# Patient Record
Sex: MALE | Race: WHITE | Employment: OTHER | URBAN - METROPOLITAN AREA
[De-identification: names, ages, dates, MRNs, and addresses within clinical notes are randomized per-mention and may not be internally consistent; named-entity substitution may affect disease eponyms.]

---

## 2017-11-16 ENCOUNTER — OFFICE VISIT (OUTPATIENT)
Dept: DERMATOLOGY | Age: 77
End: 2017-11-16

## 2017-11-16 DIAGNOSIS — L57.0 AK (ACTINIC KERATOSIS): Primary | ICD-10-CM

## 2017-11-16 DIAGNOSIS — D48.5 NEOPLASM OF UNCERTAIN BEHAVIOR OF SKIN: ICD-10-CM

## 2017-11-16 PROCEDURE — 17003 DESTRUCT PREMALG LES 2-14: CPT | Performed by: DERMATOLOGY

## 2017-11-16 PROCEDURE — G8421 BMI NOT CALCULATED: HCPCS | Performed by: DERMATOLOGY

## 2017-11-16 PROCEDURE — 4040F PNEUMOC VAC/ADMIN/RCVD: CPT | Performed by: DERMATOLOGY

## 2017-11-16 PROCEDURE — 17000 DESTRUCT PREMALG LESION: CPT | Performed by: DERMATOLOGY

## 2017-11-16 PROCEDURE — G8484 FLU IMMUNIZE NO ADMIN: HCPCS | Performed by: DERMATOLOGY

## 2017-11-16 PROCEDURE — 1036F TOBACCO NON-USER: CPT | Performed by: DERMATOLOGY

## 2017-11-16 PROCEDURE — 11100 PR BIOPSY OF SKIN LESION: CPT | Performed by: DERMATOLOGY

## 2017-11-16 PROCEDURE — 11101 PR BIOPSY, EACH ADDED LESION: CPT | Performed by: DERMATOLOGY

## 2017-11-16 PROCEDURE — 1123F ACP DISCUSS/DSCN MKR DOCD: CPT | Performed by: DERMATOLOGY

## 2017-11-16 PROCEDURE — 99213 OFFICE O/P EST LOW 20 MIN: CPT | Performed by: DERMATOLOGY

## 2017-11-16 PROCEDURE — G8427 DOCREV CUR MEDS BY ELIG CLIN: HCPCS | Performed by: DERMATOLOGY

## 2017-11-16 NOTE — PROGRESS NOTES
[facility] Dermatology  Mauri Arce MD  [id number]  1940    76 y.o. male     Date of Visit: 11/16/2017    Chief Complaint: f/u for AKs    History of Present Illness:    \"Jl\"    1. F/u for multiple AKs - complains of scaly lesions on the face and upper extremities. 2.  He also complains of a new noted lesion on the left lower lip. Unknown duration of a lesion on the nasal tip. He has a persistent brown patch on the left lower cheek. Review of Systems:  Skin: No new or changing moles. Past Medical History, Family History, Surgical History, Medications and Allergies reviewed. 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 Physical Examination       The following were examined and determined to be normal: Psych/Neuro, Scalp/hair, Conjunctivae/eyelids, Gums/teeth/lips, Neck, Breast/axilla/chest, Abdomen, Back, RLE, LLE and Nails/digits. The following were examined and determined to be abnormal: Head/face, RUE and LUE. Well appearing. 1.  Hands and forearms - numerous ill defined irreg shaped keratotic pink macules. Right temple - 1, right cheek - 2, right helix - 3, proximal nasal dorsum - 2, left lateral cheek - 2: ill defined irreg shaped keratotic pink macules. 2.    A.  Left nasal tip - 4 mm telangiectatic pearly papule. B.   Left lower cutaneous lip - 4 mm

## 2017-11-16 NOTE — PATIENT INSTRUCTIONS
Biopsy Wound Care Instructions    · Keep the bandage in place for 24 hours. · Cleanse the wound with mild soapy water daily   Gently dry the area.  Apply Vaseline or petroleum jelly to the wound using a cotton tipped applicator.  Cover with a clean bandage.  Repeat this process until the biopsy site is healed.  If you had stitches placed, continue treating the site until the stitches are removed. Remember to make an appointment to return to have your stitches removed by our staff.  You may shower and bathe as usual.       ** Biopsy results generally take around 7 business days to come back. If you have not heard from us by then, please call the office at [phone] between 8AM and 4PM Monday through Friday. Cryosurgery (Freezing) Wound Care Instructions    AFTER THE PROCEDURE:    You will notice swelling and redness around the site. This is normal.    You may experience a sharp or sore feeling for the next several days. For this discomfort, you may take acetaminophen (Tylenol©).  A blister may develop at the treated area, sometimes as soon as by the end of the day. After several days, the blister will subside and a scab will form.  If the area is bumped or traumatized during the first few days following freezing, you may develop bleeding into the blister, forming a blood blister. This is nothing to be alarmed about.  If the blister is tense, uncomfortable, or much larger than the site that was frozen, you may pop the blister along its edge with a sterile needle (boiled, heated under a flame, or cleaned with alcohol) to allow the fluid to drain out. If the blister does not bother you, no treatment is needed.  Do NOT peel off the top of the blister roof. It will act as a dressing on top of your wound. WOUND CARE:    You may shower or bathe as usual, but avoid scrubbing the areas that have been frozen.      Cleanse the site twice a day with mild soapy water, and then apply a thin

## 2017-11-21 ENCOUNTER — TELEPHONE (OUTPATIENT)
Dept: DERMATOLOGY | Age: 77
End: 2017-11-21

## 2017-11-21 NOTE — TELEPHONE ENCOUNTER
[facility] called and running more stains on his left cheek. Will be delayed     Call back # if you need the other biopsy that they are running sooner, call at [phone].

## 2017-11-22 ENCOUNTER — TELEPHONE (OUTPATIENT)
Dept: DERMATOLOGY | Age: 77
End: 2017-11-22

## 2017-11-30 ENCOUNTER — TELEPHONE (OUTPATIENT)
Dept: DERMATOLOGY | Age: 77
End: 2017-11-30

## 2017-11-30 DIAGNOSIS — C44.01 BCC (BASAL CELL CARCINOMA), LIP: ICD-10-CM

## 2017-11-30 DIAGNOSIS — C44.311 BASAL CELL CARCINOMA OF NASAL TIP: ICD-10-CM

## 2017-11-30 DIAGNOSIS — D03.39 MELANOMA IN SITU OF CHEEK (HCC): Primary | ICD-10-CM

## 2017-11-30 NOTE — TELEPHONE ENCOUNTER
Patient calling back in regards to his biopsy results. Will ohnly be home for 20 mins or so. Then will home later today.     Call back# [phone]

## 2017-12-11 ENCOUNTER — PROCEDURE VISIT (OUTPATIENT)
Dept: SURGERY | Age: 77
End: 2017-12-11

## 2017-12-11 VITALS
SYSTOLIC BLOOD PRESSURE: 131 MMHG | TEMPERATURE: 97.6 F | WEIGHT: 193 LBS | OXYGEN SATURATION: 99 % | DIASTOLIC BLOOD PRESSURE: 73 MMHG | HEART RATE: 66 BPM

## 2017-12-11 DIAGNOSIS — D03.39 LENTIGO MALIGNA OF LEFT CHEEK (HCC): Primary | ICD-10-CM

## 2017-12-11 PROCEDURE — 11644 EXC F/E/E/N/L MAL+MRG 3.1-4: CPT | Performed by: DERMATOLOGY

## 2017-12-11 NOTE — PATIENT INSTRUCTIONS
stop (see above)   Pain that lasts longer than 48 hours   Your wound becomes more painful, red or hot   Bruising and swelling that does not improve within 48 hours or gets worse suddenly

## 2017-12-11 NOTE — PROGRESS NOTES
map was drawn and labelled and placed in the patient's chart as well as sent with the pathologic specimens. A pressure dressing was applied to the wound. The patient was given detailed oral and written instructions on home care and all questions were answered. The patient tolerated the procedure well without any complications. The patient left the office in stable condition. The patient is scheduled to return on Monday December 18  for Stage II or repair depending on pathology results.

## 2017-12-18 RX ORDER — CEPHALEXIN 500 MG/1
500 CAPSULE ORAL 3 TIMES DAILY
Qty: 15 CAPSULE | Refills: 0 | Status: SHIPPED | OUTPATIENT
Start: 2017-12-18 | End: 2018-09-20 | Stop reason: ALTCHOICE

## 2017-12-20 ENCOUNTER — PROCEDURE VISIT (OUTPATIENT)
Dept: SURGERY | Age: 77
End: 2017-12-20

## 2017-12-20 VITALS
WEIGHT: 193 LBS | DIASTOLIC BLOOD PRESSURE: 67 MMHG | TEMPERATURE: 97.6 F | SYSTOLIC BLOOD PRESSURE: 110 MMHG | OXYGEN SATURATION: 97 % | HEART RATE: 66 BPM

## 2017-12-20 DIAGNOSIS — Z86.006 HISTORY OF MELANOMA IN SITU: ICD-10-CM

## 2017-12-20 DIAGNOSIS — S01.80XA OPEN WOUND OF FACE, INITIAL ENCOUNTER: Primary | ICD-10-CM

## 2017-12-20 PROBLEM — D03.39: Status: ACTIVE | Noted: 2017-12-20

## 2017-12-20 PROCEDURE — 14301 TIS TRNFR ANY 30.1-60 SQ CM: CPT | Performed by: DERMATOLOGY

## 2017-12-20 NOTE — PROGRESS NOTES
PRE-PROCEDURE SCREENING    Pacemaker/ICD: No  Difficulty with numbing in the past: No  Local Anesthesia Reaction/passing out: No  Latex or adhesive allergy:  No  Bleeding/Clotting Disorders: No  Anticoagulant Therapy: Yes  Joint prosthesis: No  Artificial Heart Valve: No  Stroke or Seizures: No  Organ Transplant or Lymphoma: No  Immunosuppression: No  Respiratory Problems: No
procedure well without any complications. The patient left the office in good condition. The patient will return for suture removal/wound check in 7-21 days.

## 2018-01-11 ENCOUNTER — OFFICE VISIT (OUTPATIENT)
Dept: SURGERY | Age: 78
End: 2018-01-11

## 2018-01-11 DIAGNOSIS — Z86.006 HISTORY OF MELANOMA IN SITU: ICD-10-CM

## 2018-01-11 DIAGNOSIS — L90.5 SCAR CONDITION AND FIBROSIS OF SKIN: Primary | ICD-10-CM

## 2018-01-11 PROCEDURE — 99024 POSTOP FOLLOW-UP VISIT: CPT | Performed by: DERMATOLOGY

## 2018-01-11 NOTE — PROGRESS NOTES
S: The patient is here for wound check s/p delayed repair of a staged excision on the left lower cheekwith Rhombic Transposition Flap repair, 3 weeks ago. The patient states that he feels the area is healing well but was concerned with how the scar will look over time. O: The wound/scar shows no signs of infection/bleeding or other complications (no erythema, edema, pain, purulence or drainage). Has healed very well. A/P:  No issues. Slight firmness of scar which is within normal range at 3 weeks out. D/w pt at length massage and expectations over time. We also discussed two other lesions - bx proven BCCs on left lower cutaneous lip and left nasal tip. Pt would like to wait until April to have these removed due to a busy work schedule. I informed him that BCCS grow very slowly and waiting until April would likely not impact size significantly, but no guarantee in regards to rate of growth. Encouraged him to come in sooner if he notices lesions growing more rapidly or becoming symptomatic. Patient questions answered and expectations reviewed. The patient is scheduled for f/u skin examination with General Dermatology per their recommendations.

## 2018-04-02 ENCOUNTER — PROCEDURE VISIT (OUTPATIENT)
Dept: SURGERY | Age: 78
End: 2018-04-02

## 2018-04-02 VITALS
WEIGHT: 195 LBS | HEART RATE: 63 BPM | OXYGEN SATURATION: 96 % | SYSTOLIC BLOOD PRESSURE: 125 MMHG | TEMPERATURE: 97.8 F | DIASTOLIC BLOOD PRESSURE: 75 MMHG

## 2018-04-02 DIAGNOSIS — C44.311 BASAL CELL CARCINOMA OF NOSE: Primary | ICD-10-CM

## 2018-04-02 PROCEDURE — 14060 TIS TRNFR E/N/E/L 10 SQ CM/<: CPT | Performed by: DERMATOLOGY

## 2018-04-02 PROCEDURE — 17312 MOHS ADDL STAGE: CPT | Performed by: DERMATOLOGY

## 2018-04-02 PROCEDURE — 17311 MOHS 1 STAGE H/N/HF/G: CPT | Performed by: DERMATOLOGY

## 2018-04-03 ENCOUNTER — TELEPHONE (OUTPATIENT)
Dept: SURGERY | Age: 78
End: 2018-04-03

## 2018-04-06 ENCOUNTER — NURSE ONLY (OUTPATIENT)
Dept: SURGERY | Age: 78
End: 2018-04-06

## 2018-04-06 ENCOUNTER — TELEPHONE (OUTPATIENT)
Dept: SURGERY | Age: 78
End: 2018-04-06

## 2018-04-06 DIAGNOSIS — Z51.89 VISIT FOR WOUND CHECK: Primary | ICD-10-CM

## 2018-04-09 ENCOUNTER — PROCEDURE VISIT (OUTPATIENT)
Dept: SURGERY | Age: 78
End: 2018-04-09

## 2018-04-09 VITALS
DIASTOLIC BLOOD PRESSURE: 72 MMHG | SYSTOLIC BLOOD PRESSURE: 114 MMHG | OXYGEN SATURATION: 95 % | TEMPERATURE: 97.9 F | WEIGHT: 195 LBS | HEART RATE: 63 BPM

## 2018-04-09 DIAGNOSIS — C44.01 BCC (BASAL CELL CARCINOMA), LIP: Primary | ICD-10-CM

## 2018-04-09 DIAGNOSIS — Z48.02 VISIT FOR SUTURE REMOVAL: ICD-10-CM

## 2018-04-09 PROCEDURE — 17311 MOHS 1 STAGE H/N/HF/G: CPT | Performed by: DERMATOLOGY

## 2018-04-09 PROCEDURE — 12051 INTMD RPR FACE/MM 2.5 CM/<: CPT | Performed by: DERMATOLOGY

## 2018-04-10 ENCOUNTER — TELEPHONE (OUTPATIENT)
Dept: SURGERY | Age: 78
End: 2018-04-10

## 2018-04-16 ENCOUNTER — TELEPHONE (OUTPATIENT)
Dept: SURGERY | Age: 78
End: 2018-04-16

## 2018-05-21 ENCOUNTER — OFFICE VISIT (OUTPATIENT)
Dept: DERMATOLOGY | Age: 78
End: 2018-05-21

## 2018-05-21 ENCOUNTER — OFFICE VISIT (OUTPATIENT)
Dept: SURGERY | Age: 78
End: 2018-05-21

## 2018-05-21 DIAGNOSIS — Z85.828 HISTORY OF BASAL CELL CARCINOMA: ICD-10-CM

## 2018-05-21 DIAGNOSIS — L90.5 SCAR CONDITION AND FIBROSIS OF SKIN: Primary | ICD-10-CM

## 2018-05-21 DIAGNOSIS — L57.0 AK (ACTINIC KERATOSIS): Primary | ICD-10-CM

## 2018-05-21 DIAGNOSIS — Z86.006 HISTORY OF MELANOMA IN SITU: ICD-10-CM

## 2018-05-21 PROCEDURE — G8427 DOCREV CUR MEDS BY ELIG CLIN: HCPCS | Performed by: DERMATOLOGY

## 2018-05-21 PROCEDURE — 1123F ACP DISCUSS/DSCN MKR DOCD: CPT | Performed by: DERMATOLOGY

## 2018-05-21 PROCEDURE — 99024 POSTOP FOLLOW-UP VISIT: CPT | Performed by: DERMATOLOGY

## 2018-05-21 PROCEDURE — 99213 OFFICE O/P EST LOW 20 MIN: CPT | Performed by: DERMATOLOGY

## 2018-05-21 PROCEDURE — 1036F TOBACCO NON-USER: CPT | Performed by: DERMATOLOGY

## 2018-05-21 PROCEDURE — G8421 BMI NOT CALCULATED: HCPCS | Performed by: DERMATOLOGY

## 2018-05-21 PROCEDURE — 4040F PNEUMOC VAC/ADMIN/RCVD: CPT | Performed by: DERMATOLOGY

## 2018-05-21 RX ORDER — FLUOROURACIL 50 MG/G
CREAM TOPICAL
Qty: 40 G | Refills: 0 | Status: SHIPPED | OUTPATIENT
Start: 2018-05-21 | End: 2022-10-06 | Stop reason: ALTCHOICE

## 2018-05-21 RX ORDER — BIMATOPROST 0.01 %
DROPS OPHTHALMIC (EYE)
COMMUNITY
Start: 2018-05-20

## 2018-06-28 ENCOUNTER — TELEPHONE (OUTPATIENT)
Dept: SURGERY | Age: 78
End: 2018-06-28

## 2018-07-23 ENCOUNTER — OFFICE VISIT (OUTPATIENT)
Dept: SURGERY | Age: 78
End: 2018-07-23

## 2018-07-23 DIAGNOSIS — L90.5 SCAR CONDITION AND FIBROSIS OF SKIN: Primary | ICD-10-CM

## 2018-07-23 PROCEDURE — 99024 POSTOP FOLLOW-UP VISIT: CPT | Performed by: DERMATOLOGY

## 2018-07-23 NOTE — PROGRESS NOTES
S: The patient is here for scar check s/p Mohs surger  on the Left nasal tip/ Left lower cutaneous lip with Unilateral Advancement Flap and Intermediate Layered Closure repair, 16 and 15 weeks ago. The patient stated that the lower lip still feels firm. O: The scar is well-approximated and shows no signs of abnormal healing (expected amount of erythema, fullness and coloration), there is an in grown hair at the inferior part of the scar that was removed. A/P:encouraged massage. Patient questions answered and expectations reviewed. The patient is scheduled for f/u scar check as needed and skin examination with General Dermatology per their recommendations.

## 2018-09-20 ENCOUNTER — OFFICE VISIT (OUTPATIENT)
Dept: DERMATOLOGY | Age: 78
End: 2018-09-20

## 2018-09-20 DIAGNOSIS — D48.5 NEOPLASM OF UNCERTAIN BEHAVIOR OF SKIN: ICD-10-CM

## 2018-09-20 DIAGNOSIS — L57.0 AK (ACTINIC KERATOSIS): Primary | ICD-10-CM

## 2018-09-20 DIAGNOSIS — Z85.828 HISTORY OF BASAL CELL CARCINOMA: ICD-10-CM

## 2018-09-20 DIAGNOSIS — Z86.006 HISTORY OF MELANOMA IN SITU: ICD-10-CM

## 2018-09-20 PROCEDURE — 4040F PNEUMOC VAC/ADMIN/RCVD: CPT | Performed by: DERMATOLOGY

## 2018-09-20 PROCEDURE — 1123F ACP DISCUSS/DSCN MKR DOCD: CPT | Performed by: DERMATOLOGY

## 2018-09-20 PROCEDURE — 99213 OFFICE O/P EST LOW 20 MIN: CPT | Performed by: DERMATOLOGY

## 2018-09-20 PROCEDURE — 1036F TOBACCO NON-USER: CPT | Performed by: DERMATOLOGY

## 2018-09-20 PROCEDURE — G8427 DOCREV CUR MEDS BY ELIG CLIN: HCPCS | Performed by: DERMATOLOGY

## 2018-09-20 PROCEDURE — G8421 BMI NOT CALCULATED: HCPCS | Performed by: DERMATOLOGY

## 2018-09-20 PROCEDURE — 11100 PR BIOPSY OF SKIN LESION: CPT | Performed by: DERMATOLOGY

## 2018-09-20 PROCEDURE — 1101F PT FALLS ASSESS-DOCD LE1/YR: CPT | Performed by: DERMATOLOGY

## 2018-09-20 NOTE — PROGRESS NOTES
[facility] Dermatology  Matt Pennington MD  [id number]  1940    76 y.o. male     Date of Visit: 9/20/2018    Chief Complaint: f/u for skin cancers and AKs    History of Present Illness:    1. Follow-up for multiple actinic keratoses of the right temple, cheeks, nose and forearms. They remain unchanged. He has not yet used Efudex cream.    2.  Unknown duration of a pink lesion on the left nasal ala. 3.  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 Wiliam Roche in December 2017. He denies any signs of recurrence. 4.  He also has a history of recent BCCsdenies any signs of recurrence. Nodular BCC of the left lower cutaneous liptreated with Mohs by Dr. Wiliam Roche on 4/9/2018. Nodular BCC of the left nasal tiptreated with Mohs by Dr. Wiliam Roche on 4/2/2018. Review of Systems:  Skin: No new or changing moles. Past Medical History, Family History, Surgical History, Medications and Allergies reviewed. Past Medical History:   Diagnosis Date    Cancer ([facility] [address]     Lentigo Maligna    Hyperlipidemia     Hypertension      Past Surgical History:   Procedure Laterality Date    EYE SURGERY      MOHS SURGERY  12/11/2017    left cheek, left nasal tip       No Known Allergies  Outpatient Prescriptions Marked as Taking for the 9/20/18 encounter (Office Visit) with Melvi Rai MD   Medication Sig Dispense Refill    LUMIGAN 0.01 % SOLN ophthalmic drops       atorvastatin (LIPITOR) 10 MG tablet Take 10 mg by mouth daily   10    aspirin 81 MG tablet Take 81 mg by mouth daily      BENICAR HCT 20-12.5 MG per tablet Take 1 tablet by mouth daily          Physical Examination       The following were examined and determined to be normal: Psych/Neuro, Scalp/hair, Conjunctivae/eyelids, Gums/teeth/lips, Neck, Breast/axilla/chest, Abdomen, Back, RLE, LLE and Nails/digits.     The following were examined and determined to be abnormal:

## 2018-09-24 LAB — DERMATOLOGY PATHOLOGY REPORT: ABNORMAL

## 2018-11-13 ENCOUNTER — TELEPHONE (OUTPATIENT)
Dept: DERMATOLOGY | Age: 78
End: 2018-11-13

## 2018-11-13 DIAGNOSIS — C44.311 BASAL CELL CARCINOMA OF NOSE: Primary | ICD-10-CM

## 2019-01-07 ENCOUNTER — TELEPHONE (OUTPATIENT)
Dept: SURGERY | Age: 79
End: 2019-01-07

## 2019-01-09 ENCOUNTER — OFFICE VISIT (OUTPATIENT)
Dept: DERMATOLOGY | Age: 79
End: 2019-01-09
Payer: MEDICARE

## 2019-01-09 DIAGNOSIS — C44.311 BASAL CELL CARCINOMA (BCC) OF LEFT SIDE OF NOSE: ICD-10-CM

## 2019-01-09 DIAGNOSIS — L57.0 AK (ACTINIC KERATOSIS): Primary | ICD-10-CM

## 2019-01-09 DIAGNOSIS — Z86.006 HISTORY OF MELANOMA IN SITU: ICD-10-CM

## 2019-01-09 PROCEDURE — 99213 OFFICE O/P EST LOW 20 MIN: CPT | Performed by: DERMATOLOGY

## 2019-01-10 ENCOUNTER — PROCEDURE VISIT (OUTPATIENT)
Dept: SURGERY | Age: 79
End: 2019-01-10
Payer: MEDICARE

## 2019-01-10 VITALS
TEMPERATURE: 97.5 F | HEART RATE: 64 BPM | SYSTOLIC BLOOD PRESSURE: 129 MMHG | WEIGHT: 195 LBS | OXYGEN SATURATION: 98 % | DIASTOLIC BLOOD PRESSURE: 78 MMHG

## 2019-01-10 DIAGNOSIS — C44.311 BASAL CELL CARCINOMA OF LEFT ALA NASI: Primary | ICD-10-CM

## 2019-01-10 PROCEDURE — 17311 MOHS 1 STAGE H/N/HF/G: CPT | Performed by: DERMATOLOGY

## 2019-01-10 PROCEDURE — 14060 TIS TRNFR E/N/E/L 10 SQ CM/<: CPT | Performed by: DERMATOLOGY

## 2019-01-11 ENCOUNTER — TELEPHONE (OUTPATIENT)
Dept: SURGERY | Age: 79
End: 2019-01-11

## 2019-01-16 ENCOUNTER — OFFICE VISIT (OUTPATIENT)
Dept: SURGERY | Age: 79
End: 2019-01-16

## 2019-01-16 DIAGNOSIS — Z48.02 VISIT FOR SUTURE REMOVAL: Primary | ICD-10-CM

## 2019-01-16 PROCEDURE — 99024 POSTOP FOLLOW-UP VISIT: CPT | Performed by: DERMATOLOGY

## 2019-01-23 ENCOUNTER — TELEPHONE (OUTPATIENT)
Dept: SURGERY | Age: 79
End: 2019-01-23

## 2019-07-09 ENCOUNTER — OFFICE VISIT (OUTPATIENT)
Dept: DERMATOLOGY | Age: 79
End: 2019-07-09
Payer: MEDICARE

## 2019-07-09 DIAGNOSIS — Z86.006 HISTORY OF MELANOMA IN SITU: ICD-10-CM

## 2019-07-09 DIAGNOSIS — Z85.828 HISTORY OF BASAL CELL CARCINOMA: ICD-10-CM

## 2019-07-09 DIAGNOSIS — L57.0 ACTINIC KERATOSIS: Primary | ICD-10-CM

## 2019-07-09 PROCEDURE — 4040F PNEUMOC VAC/ADMIN/RCVD: CPT | Performed by: DERMATOLOGY

## 2019-07-09 PROCEDURE — G8421 BMI NOT CALCULATED: HCPCS | Performed by: DERMATOLOGY

## 2019-07-09 PROCEDURE — 1036F TOBACCO NON-USER: CPT | Performed by: DERMATOLOGY

## 2019-07-09 PROCEDURE — 17004 DESTROY PREMAL LESIONS 15/>: CPT | Performed by: DERMATOLOGY

## 2019-07-09 PROCEDURE — 1123F ACP DISCUSS/DSCN MKR DOCD: CPT | Performed by: DERMATOLOGY

## 2019-07-09 PROCEDURE — 99213 OFFICE O/P EST LOW 20 MIN: CPT | Performed by: DERMATOLOGY

## 2019-07-09 PROCEDURE — G8427 DOCREV CUR MEDS BY ELIG CLIN: HCPCS | Performed by: DERMATOLOGY

## 2019-07-09 RX ORDER — NAPROXEN 500 MG/1
TABLET ORAL
Refills: 3 | COMMUNITY
Start: 2019-04-09

## 2019-07-09 RX ORDER — CHOLECALCIFEROL (VITAMIN D3) 1250 MCG
CAPSULE ORAL
COMMUNITY

## 2019-07-09 RX ORDER — PRAVASTATIN SODIUM 20 MG
TABLET ORAL
Refills: 3 | COMMUNITY
Start: 2019-05-16

## 2020-01-09 ENCOUNTER — OFFICE VISIT (OUTPATIENT)
Dept: DERMATOLOGY | Age: 80
End: 2020-01-09
Payer: MEDICARE

## 2020-01-09 PROCEDURE — 11102 TANGNTL BX SKIN SINGLE LES: CPT | Performed by: DERMATOLOGY

## 2020-01-09 PROCEDURE — 17004 DESTROY PREMAL LESIONS 15/>: CPT | Performed by: DERMATOLOGY

## 2020-01-09 NOTE — PROGRESS NOTES
[facility] Dermatology  Tyra Torres MD  [id number]  1940    78 y.o. male     Date of Visit: 1/9/2020    Chief Complaint: skin lesions    History of Present Illness:    1. He returns today to follow-up for history of numerous actinic keratoses. Nearly 30 were treated with cryotherapy at last visit with improvement. Has few new facial lesions today. 2.  Unknown duration of an asymptomatic lesion on the nasal dorsum. 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 Glen Councilman in December 2017. Nodular BCC of the left nasal ala-treated with Mohs by Dr. Glen Councilman on 1/10/2019. Nodular BCC of the left lower cutaneous lip-treated with Mohs by Dr. Glen Councilman on 4/9/2018. Nodular BCC of the left nasal tip-treated with Mohs by Dr. Glen Councilman on 4/2/2018. Review of Systems:  Skin: No new or changing moles. Past Medical History, Family History, Surgical History, Medications and Allergies reviewed.     Past Medical History:   Diagnosis Date    Cancer ([facility] [address]     Lentigo Maligna    Hyperlipidemia     Hypertension      Past Surgical History:   Procedure Laterality Date    EYE SURGERY      MOHS SURGERY  12/11/2017    left cheek, left nasal tip       No Known Allergies  Outpatient Medications Marked as Taking for the 1/9/20 encounter (Office Visit) with Rosi San MD   Medication Sig Dispense Refill    pravastatin (PRAVACHOL) 20 MG tablet   3    naproxen (NAPROSYN) 500 MG tablet   3    Cholecalciferol (VITAMIN D3) 55334 units CAPS Take by mouth      LUMIGAN 0.01 % SOLN ophthalmic drops       aspirin 81 MG tablet Take 81 mg by mouth daily      BENICAR HCT 20-12.5 MG per tablet Take 1 tablet by mouth daily            Physical Examination       The following were examined and determined to be normal: Psych/Neuro, Scalp/hair, Conjunctivae/eyelids, Gums/teeth/lips, Neck, Breast/axilla/chest, Abdomen, Back, RUE, LUE, RLE, LLE and Nails/digits. The following were examined and determined to be abnormal: Head/face. Well appearing. 1.  Right lower cheek - 1, right lateral cheek - 1, right superior helix - 1, nasal dorsum - 2, left temple - 1, left lateral brow - 1, left forehead - 1, left lower lip - 1, left lower cheek - 1: ill defined irreg shaped keratotic pink macules. 2.  Left proximal nasal dorsum - 5 mm telangiectatic pearly pink brown papule. Assessment and Plan     1. Actinic keratoses -     2 cycles of liquid nitrogen applied to 10 AKs: Right lower cheek - 1, right lateral cheek - 1, right superior helix - 1, nasal dorsum - 2, left temple - 1, left lateral brow - 1, left forehead - 1, left lower lip - 1, left lower cheek - 1. Patient was educated regarding the potential risks of blister formation, discomfort, hypopigmentation, and scar. Wound care was discussed. 2. Neoplasm of uncertain behavior of skin, left proximal nasal dorsum - r/o BCC    Discussed possible diagnosis; patient agreeable to biopsy (verbal consent obtained). The area(s) to be biopsied were marked with a surgical pen. Alcohol was used to cleanse the site. Local anesthesia was acheived with 1% lidocaine with epinephrine. Shave biopsy was performed using a razor blade. Hemostasis was achieved with aluminum chloride. The wound(s) were dressed with petrolatum and covered with a bandage. Wound care instructions were reviewed. 1 Specimen (s) sent to pathology. The specimen bottles were appropriately labeled. We also reviewed the risks of bleeding, scar, and infection. Return in about 6 months (around 7/9/2020).

## 2020-01-23 ENCOUNTER — TELEPHONE (OUTPATIENT)
Dept: DERMATOLOGY | Age: 80
End: 2020-01-23

## 2020-01-23 NOTE — TELEPHONE ENCOUNTER
Date of biopsy: 1/9/20  Site of biopsy: Proximal Nasal Dorsum     Result: BCC, Nodular     Plan: Moh's    Informed patient of biopsy results. Will place referral for Moh's surgery to Dr. Yuridia Colon. Patient verbalized understanding. Pathology report scanned into patient's chart.

## 2020-01-23 NOTE — TELEPHONE ENCOUNTER
Called [facility] and spoke to Mosley [place] to request biopsy report from 1/9/20 to be faxed over for Dr Andrey Ferrer to review because it was not electronically sent due to interface issues.

## 2020-01-23 NOTE — TELEPHONE ENCOUNTER
Voicemail received 1/23/20 at 9:59 am.    Patient calling to get biopsy results done two weeks ago.     [phone]

## 2020-02-03 ENCOUNTER — PROCEDURE VISIT (OUTPATIENT)
Dept: SURGERY | Age: 80
End: 2020-02-03
Payer: MEDICARE

## 2020-02-03 VITALS
HEART RATE: 59 BPM | TEMPERATURE: 97.3 F | WEIGHT: 186 LBS | OXYGEN SATURATION: 94 % | SYSTOLIC BLOOD PRESSURE: 124 MMHG | DIASTOLIC BLOOD PRESSURE: 73 MMHG

## 2020-02-03 PROBLEM — C44.311 BASAL CELL CARCINOMA OF DORSUM OF NOSE: Status: ACTIVE | Noted: 2020-02-03

## 2020-02-03 PROCEDURE — 13151 CMPLX RPR E/N/E/L 1.1-2.5 CM: CPT | Performed by: DERMATOLOGY

## 2020-02-03 PROCEDURE — 17311 MOHS 1 STAGE H/N/HF/G: CPT | Performed by: DERMATOLOGY

## 2020-02-03 PROCEDURE — 17312 MOHS ADDL STAGE: CPT | Performed by: DERMATOLOGY

## 2020-02-03 NOTE — PROGRESS NOTES
PRE-PROCEDURE SCREENING    Pacemaker/ICD: No  Difficulty with numbing in the past: No  Local Anesthesia Reaction/passing out: No  Latex or adhesive allergy:  No  Bleeding/Clotting Disorders: No  Anticoagulant Therapy: Yes  Joint prosthesis: No  Artificial Heart Valve: No  Stroke or Seizures: No  Organ Transplant or Lymphoma: No  Immunosuppression: No  Respiratory Problems: No
of Mohs. A map was prepared to correspond to the area of skin from which it was excised. Hemostasis was achieved using electrosurgery. The wound was bandaged. The tissue was prepared for the cryostat and sectioned. 1 section(s) prepared. Each section was coded, cut, and stained for microscopic examination. The entire base and margins of the excised piece of tissue were examined by the surgeon. STage I:  Nodular BCC: large basaloid lobules of varying shape and size with peripheral palisading present around the rim of the lobule, with retraction of the tumor lobules from their associated stroma. The remaining tumor was noted and the next stage was performed. Stage II:  A thin layer of tissue was removed at the histologically-identified sites of remaining tumor. The entire procedure as described in stage I was repeated to process the tissue according to Mohs technique. 1 section(s) prepared for stage II. No tumor was identified at the peripheral margins of stage II of microscopically controlled surgery. DEFECT MANAGEMENT:    REPAIR DESCRIPTION:  Various closure modalities were discussed with the patient, and it was decided that a complex repair would best preserve normal anatomic and functional relationships. Additional risk of wound dehiscence was discussed. This is a complex closure based on:  Exposed cartilage/bone    Nasal bone and lateral processes of septal cartilage exposed. The patient was placed on the procedure room table. The area was anesthetized with 1% lidocaine with epinephrine 1:100,000 buffered, was given a sterile prep using Chlorhexidine gluconate 4% solution, and draped in the usual sterile fashion. Recreation and enlargement of the wound was performed by excising cones of tissue via the triangulation technique.     The final incision lines were placed with respect for the patient's natural skin tension lines in a linear configuration to avoid functional

## 2020-02-03 NOTE — PATIENT INSTRUCTIONS
or Extra Strength Tylenol). Follow the instructions and warning on the bottle. -If your doctor has prescribed you an Aspirin daily, please keep taking it. Do not take extra Aspirin or medicines containing Aspirin (such as Bella-Igo and Excedrin) for 48 hours  after your procedure. Bleeding: If bleeding occurs, DO NOT remove the bandage. Put firm pressure on the area with gauze for 20 minutes without peeking. If the bleeding continue, apply pressure for another 20 minutes. If the bleeding does not stop after you apply pressure, call us right away. If you can not call, go to the nearest emergency room or urgent care facility. What to expect:  You may have these symptoms. They are normal and should get better with time:  1. Swelling. Swelling usually increases for the first 48 hours after your procedure and then begins to improve. Some soreness and redness around your wound. If we worked close to you eyes  (forehead, nose, temple, or upper cheeks) your eyes may become swollen and/ or black and blue. 2. Bruising, which could last 1 week or more. 3. Pink and bumpy appearance to the scar. This may happen a few weeks after your procedure. After 4 weeks, you may gently massage the area each day with facial moisturizer or petroleum jelly (Vaseline or Aquaphor). This will help to smooth the skin and improve the appearance of the scar. The color of your scar will fade over time, but may be pink for several months after the procedure. The scar may take 6 months to 1 year to reach its final color and appearance. 4. \"Spitting\" suture. Occasionally, an inside suture (stitch) does not completely dissolve. When this happens, (generally 4-8 weeks after surgery), it causes a bump or \"pimple\" to form on the scar. This is easily removed and is not at all serious. It does not mean the skin cancer has returned. Contact us if it happens, but do not be alarmed. Vitamin E oil is NOT necessary.  A good moisturizer is just as

## 2020-02-04 ENCOUNTER — TELEPHONE (OUTPATIENT)
Dept: SURGERY | Age: 80
End: 2020-02-04

## 2020-04-29 ENCOUNTER — TELEPHONE (OUTPATIENT)
Dept: SURGERY | Age: 80
End: 2020-04-29

## 2020-07-14 ENCOUNTER — OFFICE VISIT (OUTPATIENT)
Dept: DERMATOLOGY | Age: 80
End: 2020-07-14
Payer: MEDICARE

## 2020-07-14 VITALS — TEMPERATURE: 97.7 F

## 2020-07-14 PROCEDURE — 1123F ACP DISCUSS/DSCN MKR DOCD: CPT | Performed by: DERMATOLOGY

## 2020-07-14 PROCEDURE — 1036F TOBACCO NON-USER: CPT | Performed by: DERMATOLOGY

## 2020-07-14 PROCEDURE — 17000 DESTRUCT PREMALG LESION: CPT | Performed by: DERMATOLOGY

## 2020-07-14 PROCEDURE — 99213 OFFICE O/P EST LOW 20 MIN: CPT | Performed by: DERMATOLOGY

## 2020-07-14 PROCEDURE — 4040F PNEUMOC VAC/ADMIN/RCVD: CPT | Performed by: DERMATOLOGY

## 2020-07-14 PROCEDURE — 17003 DESTRUCT PREMALG LES 2-14: CPT | Performed by: DERMATOLOGY

## 2020-07-14 PROCEDURE — G8421 BMI NOT CALCULATED: HCPCS | Performed by: DERMATOLOGY

## 2020-07-14 PROCEDURE — G8427 DOCREV CUR MEDS BY ELIG CLIN: HCPCS | Performed by: DERMATOLOGY

## 2020-07-14 NOTE — PROGRESS NOTES
[facility] Dermatology  Presley Ruggiero MD  [phone]      Shaggy Bauer  1940    78 y.o. male     Date of Visit: 7/14/2020    Chief Complaint: skin lesions    History of Present Illness:    1. He returns today to follow-up for history of actinic keratoses. He has few new lesions on the face today. He has multiple lesions on the forearms that he has been treating with Efudex for about 1 week now. 2.  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 Radha Escobedo in December 2017. Denies any signs of recurrence. 3.  Has hx of BCCs - denies any signs of recurrence. Nodular BCC on the proximal nasal dorsum-treated with Mohs by Dr. Radha Escobedo on 2/3/2020. Nodular BCC of the left nasal ala-treated with Mohs by Dr. aRdha Escobedo on 1/10/2019. Nodular BCC of the left lower cutaneous lip-treated with Mohs by Dr. Radha Escobedo on 4/9/2018. Nodular BCC of the left nasal tip-treated with Mohs by Dr. Radha Escobedo on 4/2/2018. Review of Systems:  Skin: No new or changing moles. Past Medical History, Family History, Surgical History, Medications and Allergies reviewed. Past Medical History:   Diagnosis Date    Cancer ([address]     Lentigo Maligna    Hyperlipidemia     Hypertension      Past Surgical History:   Procedure Laterality Date    EYE SURGERY      MOHS SURGERY  12/11/2017    left cheek, left nasal tip       Allergies   Allergen Reactions    Bactrim [Sulfamethoxazole-Trimethoprim] Nausea And Vomiting     Outpatient Medications Marked as Taking for the 7/14/20 encounter (Office Visit) with Adriel Isabel MD   Medication Sig Dispense Refill    pravastatin (PRAVACHOL) 20 MG tablet   3    naproxen (NAPROSYN) 500 MG tablet   3    Cholecalciferol (VITAMIN D3) 97413 units CAPS Take by mouth      fluorouracil (EFUDEX) 5 % cream Apply twice daily to affected areas on the nose, cheeks and right temple for 14 days.  40 g 0    LUMIGAN 0.01 % SOLN ophthalmic drops      

## 2020-11-09 ENCOUNTER — OFFICE VISIT (OUTPATIENT)
Dept: DERMATOLOGY | Age: 80
End: 2020-11-09
Payer: MEDICARE

## 2020-11-09 VITALS — TEMPERATURE: 97.3 F

## 2020-11-09 PROCEDURE — G8427 DOCREV CUR MEDS BY ELIG CLIN: HCPCS | Performed by: DERMATOLOGY

## 2020-11-09 PROCEDURE — 17003 DESTRUCT PREMALG LES 2-14: CPT | Performed by: DERMATOLOGY

## 2020-11-09 PROCEDURE — G8484 FLU IMMUNIZE NO ADMIN: HCPCS | Performed by: DERMATOLOGY

## 2020-11-09 PROCEDURE — 17000 DESTRUCT PREMALG LESION: CPT | Performed by: DERMATOLOGY

## 2020-11-09 PROCEDURE — 1123F ACP DISCUSS/DSCN MKR DOCD: CPT | Performed by: DERMATOLOGY

## 2020-11-09 PROCEDURE — G8421 BMI NOT CALCULATED: HCPCS | Performed by: DERMATOLOGY

## 2020-11-09 PROCEDURE — 1036F TOBACCO NON-USER: CPT | Performed by: DERMATOLOGY

## 2020-11-09 PROCEDURE — 4040F PNEUMOC VAC/ADMIN/RCVD: CPT | Performed by: DERMATOLOGY

## 2020-11-09 PROCEDURE — 99213 OFFICE O/P EST LOW 20 MIN: CPT | Performed by: DERMATOLOGY

## 2020-11-09 NOTE — PROGRESS NOTES
[facility] Dermatology  Stephany Wild MD  [phone]      [address]  1940    78 y.o. male     Date of Visit: 11/9/2020    Chief Complaint: skin lesions    History of Present Illness:    1. Follow-up for history of multiple actinic keratoses on the face and forearms. He is used Efudex cream twice daily for 2 weeks on the forearms in the interim. He does have some remaining lesions on the face and forearms. 2.  He complains of few asymptomatic lesions on the left upper arm. 3.  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 Lew Mejia in December 2017. He denies any signs of recurrence. 4.  He also has a history of multiple basal cell carcinomas-denies any signs of recurrence. Nodular BCC on the proximal nasal dorsum-treated with Mohs by Dr. Lew Mejia on 2/3/2020. Nodular BCC of the left nasal ala-treated with Mohs by Dr. Lew Mejia on 1/10/2019. Nodular BCC of the left lower cutaneous lip-treated with Mohs by Dr. Lew Mejia on 4/9/2018. Nodular BCC of the left nasal tip-treated with Mohs by Dr. Lew Mejia on 4/2/2018. Review of Systems:  Skin: No new or changing moles. Past Medical History, Family History, Surgical History, Medications and Allergies reviewed.     Past Medical History:   Diagnosis Date    Cancer ([address]     Lentigo Maligna    Hyperlipidemia     Hypertension      Past Surgical History:   Procedure Laterality Date    EYE SURGERY      MOHS SURGERY  12/11/2017    left cheek, left nasal tip       Allergies   Allergen Reactions    Bactrim [Sulfamethoxazole-Trimethoprim] Nausea And Vomiting     Outpatient Medications Marked as Taking for the 11/9/20 encounter (Office Visit) with Tamara Lujan MD   Medication Sig Dispense Refill    pravastatin (PRAVACHOL) 20 MG tablet   3    naproxen (NAPROSYN) 500 MG tablet   3    Cholecalciferol (VITAMIN D3) 54216 units CAPS Take by mouth      LUMIGAN 0.01 % SOLN ophthalmic drops       aspirin 81 MG tablet Take 81 mg by mouth daily      BENICAR HCT 20-12.5 MG per tablet Take 1 tablet by mouth daily          Social History:  Occupation:  Retired dentist      Physical Examination       The following were examined and determined to be normal: Psych/Neuro, Scalp/hair, Conjunctivae/eyelids, Gums/teeth/lips, Neck, Breast/axilla/chest, Abdomen, Back, RLE, LLE and Nails/digits. The following were examined and determined to be abnormal: Head/face, RUE and LUE. Well-appearing. 1.  Left hand-1, left forearm-5, right hand-1, right superior helix-2, right forehead-1, left mid nasal dorsum-1: Several keratotic erythematous macules. Cheeks with more ill-defined scaly pink macules and patches. 2.  Left upper arm posteriorly with 2 stuck on appearing tan patches. 3.  Clear. 4.  Clear. Assessment and Plan     1. Actinic keratoses - multiple    2 cycles of liquid nitrogen applied to 11 AKs: Left hand-1, left forearm-5, right hand-1, right superior helix-2, right forehead-1, left mid nasal dorsum-1. Patient was educated regarding the potential risks of blister formation, discomfort, hypopigmentation, and scar. Wound care was discussed. Efudex cream twice daily to the cheeks for 14 days. We discussed the likely side effects of treatment including redness, crusting, itching and burning. 2. SK (seborrheic keratosis)     Reassurance. 3. History of melanoma in situ of the left cheek - no signs of recurrence. Sun protective behaviors and self skin examinations were encouraged. Call for any new or concerning lesions. 4. History of basal cell carcinomas - clear    Sun protective behaviors and self skin examinations were encouraged. Call for any new or concerning lesions. Return in about 4 months (around 3/9/2021).     --Yue Palmer MD

## 2021-04-21 ENCOUNTER — OFFICE VISIT (OUTPATIENT)
Dept: DERMATOLOGY | Age: 81
End: 2021-04-21
Payer: MEDICARE

## 2021-04-21 VITALS — TEMPERATURE: 97.2 F

## 2021-04-21 DIAGNOSIS — L57.0 ACTINIC KERATOSIS: Primary | ICD-10-CM

## 2021-04-21 DIAGNOSIS — Z86.006 HISTORY OF MELANOMA IN SITU: ICD-10-CM

## 2021-04-21 PROCEDURE — 17004 DESTROY PREMAL LESIONS 15/>: CPT | Performed by: DERMATOLOGY

## 2021-04-21 NOTE — PROGRESS NOTES
[facility]ermatology  Lady Ortiz MD  [phone]      Rony Ta  1940    78 y.o. male     Date of Visit: 4/21/2021    Chief Complaint: skin lesions    History of Present Illness:    1. Follow-up for history of actinic keratoses-treated with cryotherapy and Efudex in the past.  Most recently his cheeks were treated with Efudex cream twice daily for 2 weeks with improvement. He complains of several persistent lesions today. 2.  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 Shira Butler in December 2017. Nodular BCC on the proximal nasal dorsum-treated with Mohs by Dr. Shira Butler on 2/3/2020. Nodular BCC of the left nasal ala-treated with Mohs by Dr. Shira Butler on 1/10/2019. Nodular BCC of the left lower cutaneous lip-treated with Mohs by Dr. Shira Butler on 4/9/2018. Nodular BCC of the left nasal tip-treated with Mohs by Dr. Shira Butler on 4/2/2018. Review of Systems:  Gen: Feels well, good sense of health. Past Medical History, Family History, Surgical History, Medications and Allergies reviewed.     Past Medical History:   Diagnosis Date    Cancer ([address]     Lentigo Maligna    Hyperlipidemia     Hypertension      Past Surgical History:   Procedure Laterality Date    EYE SURGERY      MOHS SURGERY  12/11/2017    left cheek, left nasal tip       Allergies   Allergen Reactions    Bactrim [Sulfamethoxazole-Trimethoprim] Nausea And Vomiting     Outpatient Medications Marked as Taking for the 4/21/21 encounter (Office Visit) with Thai Vazquez MD   Medication Sig Dispense Refill    pravastatin (PRAVACHOL) 20 MG tablet   3    naproxen (NAPROSYN) 500 MG tablet   3    Cholecalciferol (VITAMIN D3) 10122 units CAPS Take by mouth      LUMIGAN 0.01 % SOLN ophthalmic drops       aspirin 81 MG tablet Take 81 mg by mouth daily      BENICAR HCT 20-12.5 MG per tablet Take 1 tablet by mouth daily              Physical Examination       The following were examined and determined to be normal: Psych/Neuro, Scalp/hair, Conjunctivae/eyelids, Gums/teeth/lips, Neck, Breast/axilla/chest, Abdomen, Back, RLE, LLE and Nails/digits. The following were examined and determined to be abnormal: Head/face, RUE, LUE and Nails/digits. Well appearing. 1.  Right thumb - 2, left hand - 1, left wrist - 3, left elbow - 2, left earlobe - 1, left cheek - 3, nasal dorsum - 2, right cheek - 3, right lower helix - 1, left upper forehead - 1: ill defined keratotic pink macules. 2.  Clear. Assessment and Plan     1. Actinic keratoses - multiple    2 cycles of liquid nitrogen applied to 19 AKs: Right thumb - 2, left hand - 1, left wrist - 3, left elbow - 2, left earlobe - 1, left cheek - 3, nasal dorsum - 2, right cheek - 3, right lower helix - 1, left upper forehead - 1. Patient was educated regarding the potential risks of blister formation and discomfort. Wound care was discussed. 2. History of melanoma in situ - no signs of recurrence. Sun protective behaviors, including use of at least SPF 30 sunscreen, and self skin examinations were encouraged. Call for any new or concerning lesions. Return in about 6 months (around 10/21/2021).     --Amara Pond MD

## 2021-10-05 ENCOUNTER — TELEPHONE (OUTPATIENT)
Dept: DERMATOLOGY | Age: 81
End: 2021-10-05

## 2021-10-05 NOTE — TELEPHONE ENCOUNTER
Called and spoke to patient and he stated that he noticed a new brown spot on his ankle that he is concerned about. He has a history of melanoma and is requesting a sooner appointment than 10/20/21 if something becomes available. I informed him that I would call him if we get a cancellation. He verbalized understanding.

## 2021-10-05 NOTE — TELEPHONE ENCOUNTER
Patient has an appointment on 10/20/2021 with Dr. Simin Valdivia and has a question about that. He says it is a personal medical question he want to ask Dr. Simin Valdivia.      Please call him at [phone]

## 2021-10-06 ENCOUNTER — OFFICE VISIT (OUTPATIENT)
Dept: DERMATOLOGY | Age: 81
End: 2021-10-06
Payer: MEDICARE

## 2021-10-06 VITALS — TEMPERATURE: 97.3 F

## 2021-10-06 DIAGNOSIS — Z86.006 HISTORY OF MELANOMA IN SITU: ICD-10-CM

## 2021-10-06 DIAGNOSIS — L82.1 SK (SEBORRHEIC KERATOSIS): Primary | ICD-10-CM

## 2021-10-06 DIAGNOSIS — L57.0 ACTINIC KERATOSIS: ICD-10-CM

## 2021-10-06 PROCEDURE — 17000 DESTRUCT PREMALG LESION: CPT | Performed by: DERMATOLOGY

## 2021-10-06 PROCEDURE — G8484 FLU IMMUNIZE NO ADMIN: HCPCS | Performed by: DERMATOLOGY

## 2021-10-06 PROCEDURE — 17003 DESTRUCT PREMALG LES 2-14: CPT | Performed by: DERMATOLOGY

## 2021-10-06 PROCEDURE — 1036F TOBACCO NON-USER: CPT | Performed by: DERMATOLOGY

## 2021-10-06 PROCEDURE — G8427 DOCREV CUR MEDS BY ELIG CLIN: HCPCS | Performed by: DERMATOLOGY

## 2021-10-06 PROCEDURE — G8421 BMI NOT CALCULATED: HCPCS | Performed by: DERMATOLOGY

## 2021-10-06 PROCEDURE — 4040F PNEUMOC VAC/ADMIN/RCVD: CPT | Performed by: DERMATOLOGY

## 2021-10-06 PROCEDURE — 1123F ACP DISCUSS/DSCN MKR DOCD: CPT | Performed by: DERMATOLOGY

## 2021-10-06 PROCEDURE — 99212 OFFICE O/P EST SF 10 MIN: CPT | Performed by: DERMATOLOGY

## 2021-10-06 NOTE — PROGRESS NOTES
[facility] Dermatology  Eliseo López MD  [phone]      iBmal Garcia  1940    78 y.o. male     Date of Visit: 10/6/2021    Chief Complaint: skin lesions    History of Present Illness:    1. He complains of a newly noted lesion on the ankle. Noted about 2 days ago. 2.  Follow-up for history of actinic keratoses-treated with cryotherapy and Efudex in the past.  Has few new lesions on the face and ears today. 3.  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 Linda Elias in December 2017. He denies any signs of recurrence. Derm Hx:  Nodular BCC on the proximal nasal dorsum-treated with Mohs by Dr. Linda Elias on 2/3/2020. Nodular BCC of the left nasal ala-treated with Mohs by Dr. Linda Elias on 1/10/2019. Nodular BCC of the left lower cutaneous lip-treated with Mohs by Dr. Linda Elias on 4/9/2018. Nodular BCC of the left nasal tip-treated with Mohs by Dr. Linda Elias on 4/2/2018. Review of Systems:  Skin: No new or changing moles. Past Medical History, Family History, Surgical History, Medications and Allergies reviewed.     Past Medical History:   Diagnosis Date    Cancer ([address]     Lentigo Maligna    Hyperlipidemia     Hypertension      Past Surgical History:   Procedure Laterality Date    EYE SURGERY      MOHS SURGERY  12/11/2017    left cheek, left nasal tip       Allergies   Allergen Reactions    Bactrim [Sulfamethoxazole-Trimethoprim] Nausea And Vomiting     Outpatient Medications Marked as Taking for the 10/6/21 encounter (Office Visit) with Quinton Green MD   Medication Sig Dispense Refill    pravastatin (PRAVACHOL) 20 MG tablet   3    naproxen (NAPROSYN) 500 MG tablet   3    Cholecalciferol (VITAMIN D3) 29648 units CAPS Take by mouth      LUMIGAN 0.01 % SOLN ophthalmic drops       aspirin 81 MG tablet Take 81 mg by mouth daily      BENICAR HCT 20-12.5 MG per tablet Take 1 tablet by mouth daily          Physical Examination       The following were examined and determined to be normal: Psych/Neuro, Scalp/hair, Conjunctivae/eyelids, Gums/teeth/lips, Neck, Breast/axilla/chest, Abdomen, Back, RLE, LLE and Nails/digits. The following were examined and determined to be abnormal: Head/face, RUE and LUE. Well appearing. 1.  Left lateral lower leg - stuck on appearing round waxy brown plaque. 2.  Forearms - several small scaly pink macules. Right lower helix - 1, right lateral cheek - 2, forehead - 3: ill defined keratotic pink macules. 3.  Clear. Assessment and Plan     1. SK (seborrheic keratosis)     Reassurance. 2. Actinic keratosis - several    2 cycles of liquid nitrogen applied to 6 AKs: Right lower helix - 1, right lateral cheek - 2, forehead - 3. Patient was educated regarding the potential risks of blister formation and discomfort. Wound care was discussed. Observe the smaller asymptomatic lesions on the forearms. 3. History of melanoma in situ of the left cheek - no signs of recurrence. Sun protective behaviors, including use of at least SPF 30 sunscreen, and self skin examinations were encouraged. Call for any new or concerning lesions. Return in about 6 months (around 4/6/2022).     --Rom Zapata MD

## 2021-10-06 NOTE — TELEPHONE ENCOUNTER
Called and left message for patient to call back office. Please offer patient this morning @ 11:15am if still available.

## 2022-04-27 ENCOUNTER — OFFICE VISIT (OUTPATIENT)
Dept: DERMATOLOGY | Age: 82
End: 2022-04-27
Payer: MEDICARE

## 2022-04-27 VITALS — TEMPERATURE: 97.1 F

## 2022-04-27 DIAGNOSIS — L57.0 ACTINIC KERATOSIS: Primary | ICD-10-CM

## 2022-04-27 DIAGNOSIS — L82.1 SK (SEBORRHEIC KERATOSIS): ICD-10-CM

## 2022-04-27 DIAGNOSIS — Z86.006 HISTORY OF MELANOMA IN SITU: ICD-10-CM

## 2022-04-27 PROCEDURE — 99213 OFFICE O/P EST LOW 20 MIN: CPT | Performed by: DERMATOLOGY

## 2022-04-27 PROCEDURE — 4040F PNEUMOC VAC/ADMIN/RCVD: CPT | Performed by: DERMATOLOGY

## 2022-04-27 PROCEDURE — G8421 BMI NOT CALCULATED: HCPCS | Performed by: DERMATOLOGY

## 2022-04-27 PROCEDURE — 1036F TOBACCO NON-USER: CPT | Performed by: DERMATOLOGY

## 2022-04-27 PROCEDURE — 1123F ACP DISCUSS/DSCN MKR DOCD: CPT | Performed by: DERMATOLOGY

## 2022-04-27 PROCEDURE — G8427 DOCREV CUR MEDS BY ELIG CLIN: HCPCS | Performed by: DERMATOLOGY

## 2022-05-09 ENCOUNTER — TELEPHONE (OUTPATIENT)
Dept: DERMATOLOGY | Age: 82
End: 2022-05-09

## 2022-05-09 ENCOUNTER — NURSE ONLY (OUTPATIENT)
Dept: DERMATOLOGY | Age: 82
End: 2022-05-09
Payer: MEDICARE

## 2022-05-09 VITALS — TEMPERATURE: 97.3 F

## 2022-05-09 DIAGNOSIS — C44.622 SQUAMOUS CELL CANCER OF SKIN OF RIGHT FOREARM: Primary | ICD-10-CM

## 2022-05-09 PROCEDURE — 17261 DSTRJ MAL LES T/A/L .6-1.0CM: CPT | Performed by: DERMATOLOGY

## 2022-05-09 NOTE — TELEPHONE ENCOUNTER
Pt calling states he was in for a 6 month f/u last week with  states one of the spots looks infected have some concern pls return call back @ [phone] to discuss

## 2022-05-09 NOTE — PATIENT INSTRUCTIONS
Biopsy Wound Care Instructions    · Keep the bandage in place for 24 hours. · Cleanse the wound with mild soapy water daily   Gently dry the area.  Apply Vaseline or petroleum jelly to the wound using a cotton tipped applicator.  Cover with a clean bandage.  Repeat this process until the biopsy site is healed.  If you had stitches placed, continue treating the site until the stitches are removed. Remember to make an appointment to return to have your stitches removed by our staff.  You may shower and bathe as usual.       ** Biopsy results generally take around 7 business days to come back. If you have not heard from us by then, please call the office at [phone]. *Please note that biopsy results are released to both the patient and physician at the same time in [address] Please allow time for your physician to review the results. One of our staff members will reach out to you with the results and plan.

## 2022-05-09 NOTE — PROGRESS NOTES
[facility]ermat[facility]  Ky Young MD  [phone]      [facility]  1940    78 y.o. male     Date of Visit: 5/9/2022    Chief Complaint: skin lesion    History of Present Illness:    He presents today for a new onset tender lesion on the right forearm. Review of Systems:  Gen: Feels well, good sense of health. Past Medical History, Family History, Surgical History, Medications and Allergies reviewed. Past Medical History:   Diagnosis Date    Cancer ([address]     Lentigo Maligna    Hyperlipidemia     Hypertension      Past Surgical History:   Procedure Laterality Date    EYE SURGERY      MOHS SURGERY  12/11/2017    left cheek, left nasal tip       Allergies   Allergen Reactions    Bactrim [Sulfamethoxazole-Trimethoprim] Nausea And Vomiting     Outpatient Medications Marked as Taking for the 5/9/22 encounter (Nurse Only) with Phil Churchill MD   Medication Sig Dispense Refill    pravastatin (PRAVACHOL) 20 MG tablet   3    naproxen (NAPROSYN) 500 MG tablet   3    Cholecalciferol (VITAMIN D3) 76598 units CAPS Take by mouth      LUMIGAN 0.01 % SOLN ophthalmic drops       aspirin 81 MG tablet Take 81 mg by mouth daily      BENICAR HCT 20-12.5 MG per tablet Take 1 tablet by mouth daily            Physical Examination       Well appearing. 1.  Right mid extensor forearm - 6 mm tender pink nodule. Assessment and Plan     1. 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 We also discussed the risks of bleeding, scar, and infection. The area(s) to be biopsied were marked with a surgical pen. Alcohol was used to cleanse the site. Local anesthesia was acheived with 1% lidocaine with epinephrine. Shave biopsy was performed using a razor blade followed by sharp curettage in multiple directions and 3 times by electrodesiccation. Hemostasis was achieved with aluminum chloride. The wound(s) were dressed with petrolatum and covered with a bandage. Wound care instructions were reviewed. 1 Specimen (s) sent to pathology. The specimen bottles were appropriately labeled. The patient tolerated the procedure well and there were no immediate complications.         --Jessica Pickens MD

## 2022-05-11 LAB — DERMATOLOGY PATHOLOGY REPORT: ABNORMAL

## 2022-10-06 ENCOUNTER — OFFICE VISIT (OUTPATIENT)
Dept: DERMATOLOGY | Age: 82
End: 2022-10-06
Payer: MEDICARE

## 2022-10-06 DIAGNOSIS — D48.5 NEOPLASM OF UNCERTAIN BEHAVIOR OF SKIN: Primary | ICD-10-CM

## 2022-10-06 DIAGNOSIS — L57.0 ACTINIC KERATOSIS: ICD-10-CM

## 2022-10-06 PROCEDURE — 11102 TANGNTL BX SKIN SINGLE LES: CPT | Performed by: DERMATOLOGY

## 2022-10-06 PROCEDURE — 17004 DESTROY PREMAL LESIONS 15/>: CPT | Performed by: DERMATOLOGY

## 2022-10-06 NOTE — PATIENT INSTRUCTIONS

## 2022-10-06 NOTE — PROGRESS NOTES
[facility] Dermatology  Mimi Valentine MD  [phone]      Nyasia Case  1940    78 y.o. male     Date of Visit: 10/6/2022    Chief Complaint: skin lesions    History of Present Illness:    1. Unknown duration of an asymptomatic lesion on the left nasal tip. 2.  He has a history of actinic keratoses-treated with cryotherapy and Efudex in the past.  He reports multiple persistent scaly lesions on the face and upper extremities today. 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 Beryle Schneider in December 2017. 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 Nodular BCC on the proximal nasal dorsum-treated with Mohs by Dr. Beryle Schneider on 2/3/2020. Nodular BCC of the left nasal ala-treated with Mohs by Dr. Beryle Schneider on 1/10/2019. Nodular BCC of the left lower cutaneous lip-treated with Mohs by Dr. Beryle Schneider on 4/9/2018. Nodular BCC of the left nasal tip-treated with Mohs by Dr. Beryle Schneider on 4/2/2018. Review of Systems:  Gen: Feels well, good sense of health. Skin: No new or changing moles, no history of keloids or hypertrophic scars. Heme: No abnormal bruising or bleeding. Past Medical History, Family History, Surgical History, Medications and Allergies reviewed.     Past Medical History:   Diagnosis Date    Cancer ([address]     Lentigo Maligna    Hyperlipidemia     Hypertension      Past Surgical History:   Procedure Laterality Date    EYE SURGERY      MOHS SURGERY  12/11/2017    left cheek, left nasal tip       Allergies   Allergen Reactions    Bactrim [Sulfamethoxazole-Trimethoprim] Nausea And Vomiting     Outpatient Medications Marked as Taking for the 10/6/22 encounter (Office Visit) with Apryl Ng MD   Medication Sig Dispense Refill    pravastatin (PRAVACHOL) 20 MG tablet   3    naproxen (NAPROSYN) 500 MG tablet   3 Cholecalciferol (VITAMIN D3) 21292 units CAPS Take by mouth      LUMIGAN 0.01 % SOLN ophthalmic drops       aspirin 81 MG tablet Take 81 mg by mouth daily      BENICAR HCT 20-12.5 MG per tablet Take 1 tablet by mouth daily            Physical Examination       The following were examined and determined to be normal: Psych/Neuro, Scalp/hair, Conjunctivae/eyelids, Gums/teeth/lips, Neck, Breast/axilla/chest, Abdomen, Back, RLE, LLE, and Nails/digits. The following were examined and determined to be abnormal: Head/face, RUE, and LUE. Well appearing. 1.  Left nasal tip - 4 to 5 mm pearly papule. 2.  Left forearm - 6, left hand - 2, right hand - 2, right forearm - 2, right cheek - 5, nasal dorsum - 2, forehead - 4, left cheek - 3: ill defined keratotic pink macules. Assessment and Plan     1. Actinic keratosis - multiple    Cryotherapy was discussed and patient agreed to proceed. Consent was obtained. 26 lesions were treated cryotherapy:  Left forearm - 6, left hand - 2, right hand - 2, right forearm - 2, right cheek - 5, nasal dorsum - 2, forehead - 4, left cheek - 3. 2 cycles of liquid nitrogen applied to each lesion for 5 seconds using a [employer] cryo spray gun. Patient was educated regarding the potential risks of blister formation and discomfort. Wound care was discussed. The patient tolerated the procedure well and there were no immediate complications. 2. Neoplasm of uncertain behavior of skin, left nasal tip - r/o BCC    Discussed possible diagnosis; patient agreeable to proceed with biopsy (written consent obtained). Risks of the procedure were reviewed including discomfort, bleeding, scar and infection. The area(s) to be biopsied were marked with a surgical pen. Alcohol was used to cleanse the site. Local anesthesia was acheived with 1% lidocaine with epinephrine. Shave biopsy was performed using a razor blade. Hemostasis was achieved with aluminum chloride.  The wound(s) were dressed with petrolatum and covered with a bandage. Wound care instructions were reviewed. 1 Specimen (s) sent to pathology. The specimen bottles were appropriately labeled. The patient tolerated the procedure well and there were no immediate complications. Return in about 6 months (around 4/6/2023).     --Yuko Stafford MD

## 2022-10-10 DIAGNOSIS — C44.311 BASAL CELL CARCINOMA (BCC) OF LEFT NASAL TIP: Primary | ICD-10-CM

## 2022-10-10 LAB — DERMATOLOGY PATHOLOGY REPORT: ABNORMAL

## 2022-10-31 ENCOUNTER — INITIAL CONSULT (OUTPATIENT)
Dept: SURGERY | Age: 82
End: 2022-10-31
Payer: MEDICARE

## 2022-10-31 DIAGNOSIS — C44.311 BASAL CELL CARCINOMA OF LEFT NASAL TIP: Primary | ICD-10-CM

## 2022-10-31 PROCEDURE — G8421 BMI NOT CALCULATED: HCPCS | Performed by: DERMATOLOGY

## 2022-10-31 PROCEDURE — G8427 DOCREV CUR MEDS BY ELIG CLIN: HCPCS | Performed by: DERMATOLOGY

## 2022-10-31 PROCEDURE — 99214 OFFICE O/P EST MOD 30 MIN: CPT | Performed by: DERMATOLOGY

## 2022-10-31 PROCEDURE — 1036F TOBACCO NON-USER: CPT | Performed by: DERMATOLOGY

## 2022-10-31 PROCEDURE — 1123F ACP DISCUSS/DSCN MKR DOCD: CPT | Performed by: DERMATOLOGY

## 2022-10-31 PROCEDURE — G8484 FLU IMMUNIZE NO ADMIN: HCPCS | Performed by: DERMATOLOGY

## 2022-10-31 NOTE — PROGRESS NOTES
[facility] Dermatology  [facility]. Enio Garcia MD  [phone]      Trini Collins  1940    78 y.o. male     Date of Visit: 10/31/2022    Chief Complaint: Biopsy proven Nodular Basal Cell Carcinoma on the left nasal tip. Biopsy performed on 10/6/2022. History of Present Illness:  1. The patient presents today, referred by  Doroteo Bauer MD, to discuss management options of the  above. The patient reports the following symptoms associated with the lesions:  none. The lesion has not been treated previously. Review of Systems:  Constitutional: Reports general sense of well-being. Skin: Denies any new or changing moles. No tendency to develop thick scars. Heme: Denies abnormal bleeding/bruising. Negative for adenopathy. Past Medical History, Surgical History, Family History, Medications and Allergies reviewed. Pre-procedure screen documented at today's visit by nursing staff reviewed and any contraindications to surgery noted below. Social History: nonsmoker    Physical Examination       -General: Well-appearing, NAD  1. On the left nasal tip there is a 5mm pink macule    Assessment and Plan     1. Basal cell carcinoma of left nasal tip    AP:  NUMBER AND COMPLEXITY OF PROBLEMS ADDRESSED  Acute illness or injury: yes - Nodular BCC left nasal tip   Chronic illness:  no  Treatment: Decision for Mohs surgery with local flap or graft reconstruction  7     Discussed alternative treatments with patient including risks and benefits, which include radiation treatment, excision, curettage, oral therapeutic agents such as erevidge. Indications for Mohs surgery were reviewed and include location. The Mohs AUC score is calculated to be: 7 which indicates that Mohs surgery is appropriate and indicated for this patients lesion. Based on this and the benefits of Mohs surgery over the above options in this patient's case, I recommend Mohs surgery as the optimal treatment.       2. DATA ANALYSIS:   Tests ordered and reviewed:  Pathology report reviewed: yes - nodular BCC  Slides ordered for consult: no  External notes reviewed: yes - Dr. Cyn Denise note reviewed  Photo reviewed: yes - photo reviewed with pt and with Dr. Virginia Us  Imaging/Labs ordered or reviewed (I.e. INR, CBC, CT, MRI, PET): no  Independent historian:  no     Independent interpretation of tests (personally looked at pathologic slides):   no    Discussion of management with another health care provider: yes - Dr. Virginia Us came to the office to talk with patient and myself to identify site     3. RISKS OF COMPLICATIONS AND/OR MORBIDITY OR MORTALITY OF PT MANAGEMENT  Decision regarding elective major surgery: The details of Mohs surgery and repair options were discussed with the patient and all questions were answered. The patient opted to proceed with scheduling Mohs surgery as soon as possible. The patient understands to call with any changes to his/her medical condition prior to surgery, including any rapid growth of the lesion discussed today or any change in medications. Repair options discussed in detail and illustrated for the patient either with a drawing or representative photos of similar reconstructions in other cases. I informed the patient that based on size and location the following would be the most likely options, although this could change depending on the final size and shape of the defect following cancer extirpation:    V to Y Advancement Flap (non-tunneled island pedicle flap)     Risks of surgery were discussed, including but not limited to bleeding, infection, sensory nerve damage, motor nerve damage, numbness, tingling, pain, need for further procedures including scar revision surgery or other scar improvement procedures, referral to other specialties for additional procedures, cosmetic outcome that does not align with patient expectation.       All questions answered in regards to the need for surgery and the possible reconstructive modalities necessary. High risk tumor characteristics that complicate surgery or post-operative healing (patient or procedure risk factors including high risk squamous cell carcinoma and the necessity of flap or graft reconstruction):  yes - likely requiring a flap based on location  Co-morbidities increasing the risk of surgical complications, mortality or morbidity: (I.e. immunosuppression, diabetes, anticoagulant therapy, pacemaker/defibrillator, prosthetic joint, tobacco use):  no    OTC Medication recommendations:  Acetaminophen and or ibuprofen for post-procedure pain if cleared by PCP and permissible with patients known co-morbidities  Patient currently on Anticoagulant or other medications that may impact surgery:  no  Plan for pre or post-operative Prescription medications: no         Electronically signed by Devora Chow MD on 11/1/2022 at 3:44 PM    I, Marquis Msos, RN, am scribing for and in the presence of Dr.Emily Maria. Smiley Luu MD,  personally performed the services described in this documentation as scribed by Marquis Moss RN  in my presence, and it is both accurate and complete.      Electronically signed by Devora Chow MD on 11/1/2022 at 3:48 PM

## 2022-11-18 ENCOUNTER — TELEPHONE (OUTPATIENT)
Dept: DERMATOLOGY | Age: 82
End: 2022-11-18

## 2022-11-18 NOTE — TELEPHONE ENCOUNTER
Pt calling states that  wants to see him back in 3 months looks like he is scheduled in April for a 5 month f/u would like clarification on appt pls return call back @ [phone] to discuss

## 2022-11-21 ENCOUNTER — TELEPHONE (OUTPATIENT)
Dept: SURGERY | Age: 82
End: 2022-11-21

## 2022-11-21 NOTE — TELEPHONE ENCOUNTER
Pt called to Cx Mohs appt on 11/28; he's having Dr. Андрей Rodriguez re-check site on 1/9 and will determine at that time if he needs to reschedule Mohs site of [address],  [place]. Appt has been cancelled.

## 2023-01-30 ENCOUNTER — OFFICE VISIT (OUTPATIENT)
Dept: DERMATOLOGY | Age: 83
End: 2023-01-30
Payer: MEDICARE

## 2023-01-30 DIAGNOSIS — L57.0 ACTINIC KERATOSIS: ICD-10-CM

## 2023-01-30 DIAGNOSIS — C44.311 BASAL CELL CARCINOMA (BCC) OF LEFT NASAL TIP: Primary | ICD-10-CM

## 2023-01-30 PROCEDURE — G8427 DOCREV CUR MEDS BY ELIG CLIN: HCPCS | Performed by: DERMATOLOGY

## 2023-01-30 PROCEDURE — 1123F ACP DISCUSS/DSCN MKR DOCD: CPT | Performed by: DERMATOLOGY

## 2023-01-30 PROCEDURE — G8484 FLU IMMUNIZE NO ADMIN: HCPCS | Performed by: DERMATOLOGY

## 2023-01-30 PROCEDURE — 17003 DESTRUCT PREMALG LES 2-14: CPT | Performed by: DERMATOLOGY

## 2023-01-30 PROCEDURE — 99212 OFFICE O/P EST SF 10 MIN: CPT | Performed by: DERMATOLOGY

## 2023-01-30 PROCEDURE — 17000 DESTRUCT PREMALG LESION: CPT | Performed by: DERMATOLOGY

## 2023-01-30 PROCEDURE — G8421 BMI NOT CALCULATED: HCPCS | Performed by: DERMATOLOGY

## 2023-01-30 PROCEDURE — 1036F TOBACCO NON-USER: CPT | Performed by: DERMATOLOGY

## 2023-01-30 NOTE — PROGRESS NOTES
[facility] Dermat[facility]  Walter De La Paz MD  [phone]      Ajay Montaño  1940    82 y.o. male     Date of Visit: 1/30/2023    Chief Complaint: follow up for BCC    History of Present Illness:    1.  He returns today for a nodular BCC on the left nasal tip-biopsied on 10/[id number] for Mohs and patient had consultation on 10/31/2022 but cancelled appointment for treatment.    2.  He has a history of actinic keratoses-treated with cryotherapy and Efudex in the past.    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 Maria in December 2017.     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  Nodular BCC on the proximal nasal dorsum-treated with Mohs by Dr. Maria on 2/3/2020.  Nodular BCC of the left nasal ala-treated with Mohs by Dr. Maria on 1/10/2019.  Nodular BCC of the left lower cutaneous lip-treated with Mohs by Dr. Maria on 4/9/2018.  Nodular BCC of the left nasal tip-treated with Mohs by Dr. Maria on 4/2/2018.      Review of Systems:  Gen: Feels well, good sense of health.      Past Medical History, Family History, Surgical History, Medications and Allergies reviewed.    Past Medical History:   Diagnosis Date    Cancer (HCC)     Lentigo Maligna    Hyperlipidemia     Hypertension      Past Surgical History:   Procedure Laterality Date    EYE SURGERY      MOHS SURGERY  12/11/2017    left cheek, left nasal tip       Allergies   Allergen Reactions    Bactrim [Sulfamethoxazole-Trimethoprim] Nausea And Vomiting     Outpatient Medications Marked as Taking for the 1/30/23 encounter (Office Visit) with Walter De La Paz MD   Medication Sig Dispense Refill    pravastatin (PRAVACHOL) 20 MG tablet   3    naproxen (NAPROSYN) 500 MG tablet   3    Cholecalciferol (VITAMIN D3) 39056 units CAPS Take by mouth      LUMIGAN 0.01 % SOLN ophthalmic  drops       BENICAR HCT 20-12.5 MG per tablet Take 1 tablet by mouth daily          Physical Examination       Well appearing. 1.  Left nasal tip clear today. 2.  Left cheek - 4, left forehead - 2, right forehead - 2: ill defined keratotic pink macules. Assessment and Plan     1. Small nodular basal cell carcinoma (BCC) of left nasal tip - appears clear after biopsy    Discussed uncertainty of clearance after biopsy. Monitor for recurrence. 2. Actinic keratoses - 8    Cryotherapy was discussed and patient agreed to proceed. Consent was obtained. 8 lesions were treated cryotherapy: Left cheek - 4, left forehead - 2, right forehead - 2. 2 cycles of liquid nitrogen applied to each lesion for 5 seconds using a [employer] cryo spray gun. Patient was educated regarding the potential risks of blister formation and discomfort. Wound care was discussed. The patient tolerated the procedure well and there were no immediate complications. Return in about 3 months (around 4/30/2023).     --Joan Mckeon MD

## 2023-04-06 ENCOUNTER — OFFICE VISIT (OUTPATIENT)
Dept: DERMATOLOGY | Age: 83
End: 2023-04-06
Payer: MEDICARE

## 2023-04-06 DIAGNOSIS — L57.0 ACTINIC KERATOSIS: Primary | ICD-10-CM

## 2023-04-06 DIAGNOSIS — C44.311 BASAL CELL CARCINOMA (BCC) OF LEFT NASAL TIP: ICD-10-CM

## 2023-04-06 PROCEDURE — 17004 DESTROY PREMAL LESIONS 15/>: CPT | Performed by: DERMATOLOGY

## 2023-04-06 PROCEDURE — 1123F ACP DISCUSS/DSCN MKR DOCD: CPT | Performed by: DERMATOLOGY

## 2023-04-06 PROCEDURE — 1036F TOBACCO NON-USER: CPT | Performed by: DERMATOLOGY

## 2023-04-06 PROCEDURE — 99212 OFFICE O/P EST SF 10 MIN: CPT | Performed by: DERMATOLOGY

## 2023-04-06 PROCEDURE — G8421 BMI NOT CALCULATED: HCPCS | Performed by: DERMATOLOGY

## 2023-04-06 PROCEDURE — G8427 DOCREV CUR MEDS BY ELIG CLIN: HCPCS | Performed by: DERMATOLOGY

## 2023-04-06 NOTE — PROGRESS NOTES
[facility] Dermatology  Monica Tanner MD  [phone]      Wilfredo Lozano  1940    78 y.o. male     Date of Visit: 4/6/2023    Chief Complaint: skin lesions    History of Present Illness:    1. Follow-up for history of actinic keratoses-has several new lesions on the face today. 2.  He also has a  nodular BCC on the left nasal tip which was biopsied in October 2022. Patient declined treatment with Mohs. Appeared to have cleared with biopsy. 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 Gayathri Mayen in December 2017. 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 Nodular BCC on the proximal nasal dorsum-treated with Mohs by Dr. Gayathri Mayen on 2/3/2020. Nodular BCC of the left nasal ala-treated with Mohs by Dr. Gayathri Mayen on 1/10/2019. Nodular BCC of the left lower cutaneous lip-treated with Mohs by Dr. Gayathri Mayen on 4/9/2018. Nodular BCC of the left nasal tip-treated with Mohs by Dr. Gayathri Mayen on 4/2/2018. Review of Systems:  Gen: Feels well, good sense of health. Skin: No new or changing moles. Past Medical History, Family History, Surgical History, Medications and Allergies reviewed.     Past Medical History:   Diagnosis Date    Cancer ([address]     Lentigo Maligna    Hyperlipidemia     Hypertension      Past Surgical History:   Procedure Laterality Date    EYE SURGERY      MOHS SURGERY  12/11/2017    left cheek, left nasal tip       Allergies   Allergen Reactions    Bactrim [Sulfamethoxazole-Trimethoprim] Nausea And Vomiting     Outpatient Medications Marked as Taking for the 4/6/23 encounter (Office Visit) with Wilfrido Plascencia MD   Medication Sig Dispense Refill    pravastatin (PRAVACHOL) 20 MG tablet   3    naproxen (NAPROSYN) 500 MG tablet as needed  3    Cholecalciferol (VITAMIN D3) 60064 units CAPS Take by mouth      LUMIGAN 0.01 %

## 2023-10-05 ENCOUNTER — OFFICE VISIT (OUTPATIENT)
Dept: DERMATOLOGY | Age: 83
End: 2023-10-05
Payer: MEDICARE

## 2023-10-05 DIAGNOSIS — L57.0 ACTINIC KERATOSIS: Primary | ICD-10-CM

## 2023-10-05 DIAGNOSIS — D48.5 NEOPLASM OF UNCERTAIN BEHAVIOR OF SKIN: ICD-10-CM

## 2023-10-05 PROCEDURE — 11102 TANGNTL BX SKIN SINGLE LES: CPT | Performed by: DERMATOLOGY

## 2023-10-05 PROCEDURE — 17004 DESTROY PREMAL LESIONS 15/>: CPT | Performed by: DERMATOLOGY

## 2023-10-05 RX ORDER — OLMESARTAN MEDOXOMIL 20 MG/1
20 TABLET ORAL DAILY
COMMUNITY
End: 2023-10-05

## 2023-10-05 RX ORDER — EZETIMIBE 10 MG/1
10 TABLET ORAL DAILY
COMMUNITY
Start: 2023-09-18

## 2023-10-05 RX ORDER — ASPIRIN 81 MG/1
81 TABLET ORAL DAILY
COMMUNITY

## 2023-10-05 NOTE — PATIENT INSTRUCTIONS
Biopsy Wound Care Instructions    Keep the bandage in place for 24 hours. Cleanse the wound with mild soapy water daily  Gently dry the area. Apply Vaseline or petroleum jelly to the wound using a cotton tipped applicator. Cover with a clean bandage. Repeat this process until the biopsy site is healed. If you had stitches placed, continue treating the site until the stitches are removed. Remember to make an appointment to return to have your stitches removed by our staff. You may shower and bathe as usual.       ** Biopsy results generally take around 7 business days to come back. If you have not heard from us by then, please call the office at [phone]. *Please note that biopsy results are released to both the patient and physician at the same time in [facility]. Please allow time for your physician to review the results. One of our staff members will reach out to you with the results and plan.

## 2023-10-05 NOTE — PROGRESS NOTES
[facility]ermat[facility]  Betty Jeffrey MD  [phone]      Alba Cline  1940    78 y.o. male     Date of Visit: 10/5/2023    Chief Complaint: skin lesions    History of Present Illness:    1. He returns today for history of AK's-has multiple lesions on the face and upper extremities today. 2.  He also reports a persistent lesion on the left forearm. Dermatologic history:    Nodular BCC on the left nasal tip which was biopsied in October 2022. Patient declined treatment with Mohs. Appeared to have cleared with biopsy. 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 Neha Redding in December 2017. 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 Nodular BCC on the proximal nasal dorsum-treated with Mohs by Dr. Neha Redding on 2/3/2020. Nodular BCC of the left nasal ala-treated with Mohs by Dr. Neha Redding on 1/10/2019. Nodular BCC of the left lower cutaneous lip-treated with Mohs by Dr. Neha Redding on 4/9/2018. Nodular BCC of the left nasal tip-treated with Mohs by Dr. Neha Redding on 4/2/2018. Review of Systems:  Gen: Feels well, good sense of health. Skin: No new or changing moles, no history of keloids or hypertrophic scars. Heme: No abnormal bruising or bleeding. Past Medical History, Family History, Surgical History, Medications and Allergies reviewed.     Past Medical History:   Diagnosis Date    Cancer ([address])     Lentigo Maligna    Hyperlipidemia     Hypertension      Past Surgical History:   Procedure Laterality Date    EYE SURGERY      MOHS SURGERY  12/11/2017    left cheek, left nasal tip       Allergies   Allergen Reactions    Bactrim [Sulfamethoxazole-Trimethoprim] Nausea And Vomiting     Outpatient Medications Marked as Taking for the 10/5/23 encounter (Office Visit) with Caprice Tobin MD   Medication Sig Dispense Refill    ezetimibe (Stefan Corns) 10 MG

## 2023-10-10 LAB — DERMATOLOGY PATHOLOGY REPORT: ABNORMAL

## 2023-10-27 ENCOUNTER — PROCEDURE VISIT (OUTPATIENT)
Dept: DERMATOLOGY | Age: 83
End: 2023-10-27

## 2023-10-27 DIAGNOSIS — C44.619 BASAL CELL CARCINOMA (BCC) OF LEFT FOREARM: Primary | ICD-10-CM

## 2023-10-27 NOTE — PROGRESS NOTES
[facility] Dermatology  Nilda Boshc MD  [address]  1940    78 y.o. male     Date of Visit: 10/27/2023    Chief Complaint: [facility]    History of Present Illness:    Here today for treatment of a nodular BCC on the left forearm. Review of Systems:  Gen: Feels well, good sense of health. Past Medical History, Family History, Surgical History, Medications and Allergies reviewed. Past Medical History:   Diagnosis Date    Cancer ([address])     Lentigo Maligna    Hyperlipidemia     Hypertension      Past Surgical History:   Procedure Laterality Date    EYE SURGERY      MOHS SURGERY  12/11/2017    left cheek, left nasal tip       Allergies   Allergen Reactions    Bactrim [Sulfamethoxazole-Trimethoprim] Nausea And Vomiting     Outpatient Medications Marked as Taking for the 10/27/23 encounter (Procedure visit) with Kyle Dan MD   Medication Sig Dispense Refill    ezetimibe (ZETIA) 10 MG tablet Take 1 tablet by mouth daily      aspirin 81 MG EC tablet Take 1 tablet by mouth daily      naproxen (NAPROSYN) 500 MG tablet as needed  3    Cholecalciferol (VITAMIN D3) 43438 units CAPS Take by mouth      LUMIGAN 0.01 % SOLN ophthalmic drops            Physical Examination       Well appearing. 1.  Left mid extensor forearm - 7 mm crusted pink macule. Assessment and Plan     1. Nodular basal cell carcinoma (BCC) of left forearm - 7 mm    Due to smaller size, patient prefers ED&C. We discussed the indication, risks (bleeding, discomfort, scar and recurrence) and benefits of the procedure. The patient agreed to proceed and a consent form was signed. The area to be treated with cleansed with alcohol and marked with surgical marking pen. Local anesthesia was acheived with 1% lidocaine with epinephrine. Sharp curettage was performed in multiple directions followed 3 times by electrodessication. Hemostasis was obtained with aluminum chloride.  The wound was dressed

## 2024-04-11 ENCOUNTER — OFFICE VISIT (OUTPATIENT)
Dept: DERMATOLOGY | Age: 84
End: 2024-04-11
Payer: MEDICARE

## 2024-04-11 DIAGNOSIS — L57.0 ACTINIC KERATOSIS: Primary | ICD-10-CM

## 2024-04-11 PROCEDURE — 17003 DESTRUCT PREMALG LES 2-14: CPT | Performed by: DERMATOLOGY

## 2024-04-11 PROCEDURE — 17000 DESTRUCT PREMALG LESION: CPT | Performed by: DERMATOLOGY

## 2024-04-11 RX ORDER — OLMESARTAN MEDOXOMIL AND HYDROCHLOROTHIAZIDE 20/12.5 20; 12.5 MG/1; MG/1
1 TABLET ORAL DAILY
COMMUNITY
Start: 2024-03-13

## 2024-04-11 RX ORDER — PRAVASTATIN SODIUM 20 MG
20 TABLET ORAL DAILY
COMMUNITY

## 2024-04-11 NOTE — PROGRESS NOTES
(BENICAR HCT) 20-12.5 MG per tablet Take 1 tablet by mouth daily      metroNIDAZOLE (METROCREAM) 0.75 % cream Apply topically 2 times daily      pravastatin (PRAVACHOL) 20 MG tablet Take 1 tablet by mouth daily      ezetimibe (ZETIA) 10 MG tablet Take 1 tablet by mouth daily      aspirin 81 MG EC tablet Take 1 tablet by mouth daily      naproxen (NAPROSYN) 500 MG tablet as needed  3    Cholecalciferol (VITAMIN D3) 44060 units CAPS Take by mouth      LUMIGAN 0.01 % SOLN ophthalmic drops            Physical Examination       The following were examined and determined to be normal: Psych/Neuro, Scalp/hair, Conjunctivae/eyelids, Gums/teeth/lips, Neck, Breast/axilla/chest, Abdomen, Back, RLE, LLE, and Nails/digits.    The following were examined and determined to be abnormal: Head/face, RUE, and LUE.     Well appearing.    1.  Right lower helix - 1, right cheek - 2, right upper lip - 1, left lower lip - 1, left forearm - 4, left hand - 1, right forearm - 3, right hand - 1: ill defined keratotic pink macules.         Assessment and Plan     1. Actinic keratoses - multiple    Cryotherapy was discussed and patient agreed to proceed.  Consent was obtained.  14 lesions were treated cryotherapy: Right lower helix - 1, right cheek - 2, right upper lip - 1, left lower lip - 1, left forearm - 4, left hand - 1, right forearm - 3, right hand - 1. 2 cycles of liquid nitrogen applied to each lesion for 5 seconds using a Cry-Ac cryo spray gun.  Patient was educated regarding the potential risks of blister formation and discomfort.  Wound care was discussed.  The patient tolerated the procedure well and there were no immediate complications.          Return in about 6 months (around 10/11/2024).    --Walter De La Paz MD

## 2024-10-07 ENCOUNTER — OFFICE VISIT (OUTPATIENT)
Dept: DERMATOLOGY | Age: 84
End: 2024-10-07
Payer: MEDICARE

## 2024-10-07 DIAGNOSIS — L57.0 ACTINIC KERATOSIS: Primary | ICD-10-CM

## 2024-10-07 PROCEDURE — 17003 DESTRUCT PREMALG LES 2-14: CPT | Performed by: DERMATOLOGY

## 2024-10-07 PROCEDURE — 17000 DESTRUCT PREMALG LESION: CPT | Performed by: DERMATOLOGY

## 2024-10-07 RX ORDER — ALLOPURINOL 100 MG/1
100 TABLET ORAL DAILY
COMMUNITY
Start: 2024-08-22

## 2024-10-07 RX ORDER — APIXABAN 5 MG/1
TABLET, FILM COATED ORAL
COMMUNITY
Start: 2024-09-16

## 2024-10-07 NOTE — PROGRESS NOTES
[facility] Dermatology  Walter De La Paz MD  [phone]      Ajay Montaño  1940    84 y.o. male     Date of Visit: 10/7/2024    Chief Complaint: skin lesions    History of Present Illness:    1.  He presents today for persistent scaly lesions on the face and hands.      Dermatologic history:     Nodular BCC on the left nasal tip which was biopsied in October 2022.  Patient declined treatment with Mohs.  Appeared to have cleared with biopsy.     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 Maria in December 2017.     Nodular BCC on the left mid extensor forearm-treated with electrodesiccation and curettage on 10/27/2023.  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  Nodular BCC on the proximal nasal dorsum-treated with Mohs by Dr. Maria on 2/3/2020.  Nodular BCC of the left nasal ala-treated with Mohs by Dr. Maria on 1/10/2019.  Nodular BCC of the left lower cutaneous lip-treated with Mohs by Dr. Maria on 4/9/2018.  Nodular BCC of the left nasal tip-treated with Mohs by Dr. Maria on 4/2/2018.    Had a DVT in May 2024 - on Eliquis.      Review of Systems:  Gen: Feels well, good sense of health.      Past Medical History, Family History, Surgical History, Medications and Allergies reviewed.    Past Medical History:   Diagnosis Date    Cancer (HCC)     Lentigo Maligna    Hyperlipidemia     Hypertension      Past Surgical History:   Procedure Laterality Date    EYE SURGERY      MOHS SURGERY  12/11/2017    left cheek, left nasal tip       Allergies   Allergen Reactions    Bactrim [Sulfamethoxazole-Trimethoprim] Nausea And Vomiting     Outpatient Medications Marked as Taking for the 10/7/24 encounter (Office Visit) with Walter De La Paz MD   Medication Sig Dispense Refill    ELIQUIS 5 MG TABS tablet ONE TABLET BY MOUTH TWICE DAILY      allopurinol (ZYLOPRIM) 100

## 2025-04-23 ENCOUNTER — OFFICE VISIT (OUTPATIENT)
Age: 85
End: 2025-04-23
Payer: MEDICARE

## 2025-04-23 DIAGNOSIS — L82.1 SK (SEBORRHEIC KERATOSIS): ICD-10-CM

## 2025-04-23 DIAGNOSIS — L57.0 ACTINIC KERATOSIS: Primary | ICD-10-CM

## 2025-04-23 PROCEDURE — 1123F ACP DISCUSS/DSCN MKR DOCD: CPT | Performed by: DERMATOLOGY

## 2025-04-23 PROCEDURE — 1160F RVW MEDS BY RX/DR IN RCRD: CPT | Performed by: DERMATOLOGY

## 2025-04-23 PROCEDURE — 17003 DESTRUCT PREMALG LES 2-14: CPT | Performed by: DERMATOLOGY

## 2025-04-23 PROCEDURE — 99212 OFFICE O/P EST SF 10 MIN: CPT | Performed by: DERMATOLOGY

## 2025-04-23 PROCEDURE — 17000 DESTRUCT PREMALG LESION: CPT | Performed by: DERMATOLOGY

## 2025-04-23 PROCEDURE — 1159F MED LIST DOCD IN RCRD: CPT | Performed by: DERMATOLOGY

## 2025-04-23 NOTE — PROGRESS NOTES
[facility] Dermatology  Walter De La Paz MD  [phone]      Ajay Montaño  1940    84 y.o. male     Date of Visit: 4/23/2025    Chief Complaint: skin lesions    History of Present Illness:    1.  He reports several persistent scaly lesions on the face.     2.  He also has a persistent growth on the lower neck.     Dermatologic history:     Nodular BCC on the left nasal tip which was biopsied in October 2022.  Patient declined treatment with Mohs.  Appeared to have cleared with biopsy.     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 Maria in December 2017.     Nodular BCC on the left mid extensor forearm-treated with electrodesiccation and curettage on 10/27/2023.  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  Nodular BCC on the proximal nasal dorsum-treated with Mohs by Dr. Maria on 2/3/2020.  Nodular BCC of the left nasal ala-treated with Mohs by Dr. Maria on 1/10/2019.  Nodular BCC of the left lower cutaneous lip-treated with Mohs by Dr. Maria on 4/9/2018.  Nodular BCC of the left nasal tip-treated with Mohs by Dr. Maria on 4/2/2018.     Had a DVT in May 2024 - on Eliquis.      Review of Systems:  Gen: Feels well, good sense of health.  Skin: No new or changing moles.    Past Medical History, Family History, Surgical History, Medications and Allergies reviewed.    Past Medical History:   Diagnosis Date    Cancer (HCC)     Lentigo Maligna    Hyperlipidemia     Hypertension      Past Surgical History:   Procedure Laterality Date    EYE SURGERY      MOHS SURGERY  12/11/2017    left cheek, left nasal tip       Allergies   Allergen Reactions    Bactrim [Sulfamethoxazole-Trimethoprim] Nausea And Vomiting     Outpatient Medications Marked as Taking for the 4/23/25 encounter (Office Visit) with Walter De La Paz MD   Medication Sig Dispense Refill    ELIQUIS 5 MG